# Patient Record
(demographics unavailable — no encounter records)

---

## 2024-12-04 NOTE — ASSESSMENT
[FreeTextEntry1] : It was my impression that the patient had a cerumen impaction that was cleared.  I recommended topical moisturizing.  I recommended avoiding Q-tips.  I reviewed aural hygiene and the role of cerumen with the patient.  I suggested a repeat visit in 4 months or earlier should the need arise. She has a vasomotor rhinitis but she does not feel that bothersome and I did not suggest other intervention.  Her hearing is normal through the speech frequencies on her last evaluation, I did not repeat today.

## 2024-12-04 NOTE — PHYSICAL EXAM
[FreeTextEntry1] : The patient was alert and oriented and in no distress. Voice was clear.  Face: The patient had no facial asymmetry or mass. The skin was unremarkable.  Eyes: The pupils were equal round and reactive to light and accommodation. There was no significant nystagmus or disconjugate gaze noted.  Nose:  The external nose had no significant deformity.  There was no facial tenderness.  On anterior rhinoscopy, the nasal mucosa was clear.  The anterior septum was midline.  There were no visualized polyps purulence  or masses.  The mucosa was flat and watery.  Oral cavity: The oral mucosa was normal. The oral and base of tongue were clear and without mass. The gingival and buccal mucosa were moist and without lesions. The palate moved well. There was no cleft to the palate. There appeared to be good salivary flow.   There was no pus, erythema or mass in the oral cavity.   Ears:  Procedure note:  Removal of Cerumen Impactions, both ears:  74035-52 Both external ears were normal. There were symptomatic cerumen impactions in both ears.  These were cleared microscopically without trauma using both suction and curettes.  After clearing,  both ear canals were clear both eardrums were intact and mobile.  Neck:  The neck was symmetrical. The parotid and submandibular glands were normal without masses. The trachea was midline and there was no unusual crepitus. The thyroid was smooth and nontender and no masses were palpated. There was no significant cervical adenopathy.   Neuro: Neurologically, the patient was awake, alert, and oriented to person, place and time. There were no obvious focal neurologic abnormalities.  Cranial nerves II through XII were grossly intact.   TMJ: The temporomandibular joints were nontender. There was no abnormal crepitus and no significant malocclusion   [de-identified] : Audiogram:  was done last visit.   Audiometry showed that both ears had normal hearing through the speech frequencies with high pitched symmetric sensorineural hearing loss as above that.  Reflexes were intact and the patient had type A tympanograms.  The audiogram was reviewed with the patient.

## 2024-12-04 NOTE — HISTORY OF PRESENT ILLNESS
[de-identified] : EDGAR LIM is a 74 year old female who comes in complaining of cerumen impactions.  She prefers the use of curettage to suction.  Audiometry last visit showed good hearing.  She has an intermittent watery nasal discharge still, but it is not so bothersome She has LPR and takes famotidine 20 mg and is doing well.  The patient had no other ear nose or throat complaints at this visit.

## 2024-12-04 NOTE — CONSULT LETTER
[FreeTextEntry2] : RAJINDER PEREZ [FreeTextEntry1] :   Dear  Dr. RAJINDER PEREZ,  I had the pleasure of seeing your patient today.   Please see my note below.   Thank you very much for allowing me to participate in the care of your patient.  Sincerely,  Kevin White MD [facility]  [facility]

## 2025-04-02 NOTE — PHYSICAL EXAM
[FreeTextEntry1] :  The patient was alert and oriented and in no distress. Voice was clear.  Face: The patient had no facial asymmetry or mass. The skin was unremarkable.  Eyes: The pupils were equal round and reactive to light and accommodation. There was no significant nystagmus or disconjugate gaze noted.  Nose:  The external nose had no significant deformity.  There was no facial tenderness.  On anterior rhinoscopy, the nasal mucosa was clear.  The anterior septum was midline.  There were no visualized polyps purulence  or masses.  Oral cavity: The oral mucosa was normal. The oral and base of tongue were clear and without mass. The gingival and buccal mucosa were moist and without lesions. The palate moved well. There was no cleft to the palate. There appeared to be good salivary flow.   There was no pus, erythema or mass in the oral cavity.   Ears:  Procedure note:  Removal of Cerumen Impactions, both ears:  19755-41 Both external ears were normal. There were symptomatic cerumen impactions in both ears.  These were cleared microscopically without trauma using both suction and curettes.  After clearing,  both ear canals were clear both eardrums were intact and mobile.    verbal consent was obtained and patient felt improvement after procedure  The findings were much improved and the canals less dry Neck:  The neck was symmetrical. The parotid and submandibular glands were normal without masses. The trachea was midline and there was no unusual crepitus. The thyroid was smooth and nontender and no masses were palpated. There was no significant cervical adenopathy.   Neuro: Neurologically, the patient was awake, alert, and oriented to person, place and time. There were no obvious focal neurologic abnormalities.  Cranial nerves II through XII were grossly intact.   TMJ: The temporomandibular joints were nontender. There was no abnormal crepitus and no significant malocclusion

## 2025-04-02 NOTE — ASSESSMENT
[FreeTextEntry1] : It was my impression that this represents a vasomotor rhinitis.  The pathogenesis was discussed.  I recommended a trial of Atrovent  .06% as needed   I asked that she speak to her ophthalmologist to make sure he or she felt this was safe   It is my impression that the patient has an an exczematoid otitis externa.  The pathogenesis was discussed.  I suggested avoiding Q-tips.  I recommended topical moisturizing and using either Dermotic drops or other oil drops.  I suggested repeat evaluation in 6 months or earlier should the need arise.